# Patient Record
Sex: MALE | Race: ASIAN | Employment: OTHER | URBAN - METROPOLITAN AREA
[De-identification: names, ages, dates, MRNs, and addresses within clinical notes are randomized per-mention and may not be internally consistent; named-entity substitution may affect disease eponyms.]

---

## 2022-04-08 PROBLEM — R73.03 PREDIABETES: Status: ACTIVE | Noted: 2022-04-08

## 2022-04-08 PROBLEM — Z86.73 HISTORY OF CVA (CEREBROVASCULAR ACCIDENT): Status: ACTIVE | Noted: 2022-04-08

## 2022-04-08 PROBLEM — E78.2 MIXED HYPERLIPIDEMIA: Status: ACTIVE | Noted: 2022-04-08

## 2022-04-08 PROBLEM — Z85.46 HISTORY OF PROSTATE CANCER: Status: ACTIVE | Noted: 2022-04-08

## 2022-04-08 PROBLEM — F32.5 DEPRESSION, MAJOR, IN REMISSION (HCC): Status: ACTIVE | Noted: 2022-04-08

## 2022-04-08 PROBLEM — I10 ESSENTIAL HYPERTENSION, BENIGN: Status: ACTIVE | Noted: 2022-04-08

## 2022-04-08 PROBLEM — F32.5 DEPRESSION, MAJOR, IN REMISSION: Status: ACTIVE | Noted: 2022-04-08

## 2024-10-03 ENCOUNTER — OFFICE VISIT (OUTPATIENT)
Facility: CLINIC | Age: 84
End: 2024-10-03
Payer: MEDICARE

## 2024-10-03 VITALS
BODY MASS INDEX: 28.66 KG/M2 | WEIGHT: 172 LBS | OXYGEN SATURATION: 98 % | SYSTOLIC BLOOD PRESSURE: 118 MMHG | HEART RATE: 68 BPM | HEIGHT: 65 IN | DIASTOLIC BLOOD PRESSURE: 64 MMHG

## 2024-10-03 DIAGNOSIS — I10 ESSENTIAL HYPERTENSION: ICD-10-CM

## 2024-10-03 DIAGNOSIS — R80.9 TYPE 2 DIABETES MELLITUS WITH DIABETIC MICROALBUMINURIA, WITHOUT LONG-TERM CURRENT USE OF INSULIN (HCC): Primary | ICD-10-CM

## 2024-10-03 DIAGNOSIS — E11.29 TYPE 2 DIABETES MELLITUS WITH DIABETIC MICROALBUMINURIA, WITHOUT LONG-TERM CURRENT USE OF INSULIN (HCC): Primary | ICD-10-CM

## 2024-10-03 DIAGNOSIS — Z86.73 HISTORY OF CVA (CEREBROVASCULAR ACCIDENT): ICD-10-CM

## 2024-10-03 DIAGNOSIS — R80.1 PERSISTENT PROTEINURIA: ICD-10-CM

## 2024-10-03 DIAGNOSIS — E78.5 DYSLIPIDEMIA: ICD-10-CM

## 2024-10-03 PROBLEM — I70.0 THORACIC AORTA ATHEROSCLEROSIS: Status: ACTIVE | Noted: 2024-08-10

## 2024-10-03 PROBLEM — I70.0 THORACIC AORTA ATHEROSCLEROSIS (HCC): Status: ACTIVE | Noted: 2024-08-10

## 2024-10-03 PROBLEM — Z86.0100 HISTORY OF COLON POLYPS: Status: ACTIVE | Noted: 2024-04-15

## 2024-10-03 PROBLEM — K21.9 GASTROESOPHAGEAL REFLUX DISEASE WITHOUT ESOPHAGITIS: Status: ACTIVE | Noted: 2024-04-15

## 2024-10-03 LAB
CARTRIDGE EXPIRATION DATE: ABNORMAL DATE
HEMOGLOBIN A1C: 5.7 % (ref 4.3–5.6)

## 2024-10-03 PROCEDURE — 99204 OFFICE O/P NEW MOD 45 MIN: CPT | Performed by: FAMILY MEDICINE

## 2024-10-03 PROCEDURE — 83036 HEMOGLOBIN GLYCOSYLATED A1C: CPT | Performed by: FAMILY MEDICINE

## 2024-10-03 PROCEDURE — G0009 ADMIN PNEUMOCOCCAL VACCINE: HCPCS | Performed by: FAMILY MEDICINE

## 2024-10-03 PROCEDURE — 90677 PCV20 VACCINE IM: CPT | Performed by: FAMILY MEDICINE

## 2024-10-03 RX ORDER — DAPAGLIFLOZIN 5 MG/1
5 TABLET, FILM COATED ORAL DAILY
COMMUNITY
Start: 2024-09-03 | End: 2025-08-29

## 2024-10-03 RX ORDER — AMLODIPINE BESYLATE 2.5 MG/1
2.5 TABLET ORAL DAILY
COMMUNITY
Start: 2024-09-11

## 2024-10-03 RX ORDER — ATORVASTATIN CALCIUM 20 MG/1
20 TABLET, FILM COATED ORAL DAILY
COMMUNITY
Start: 2024-09-28

## 2024-10-03 NOTE — PROGRESS NOTES
HPI:    Patient ID: Jose Bertrand is a 84 year old male who presents to [facility] care.    HPI  Had hemorrhagic CVA around 2010. Has been walking more slowly and has had some slurred speech as residual deficits. No other issues.     DM:   Taking metformin 500mg daily and farxiga 5mg daily.   Hx of albuminuria and is on losartan. Sees nephrology.   On statin.     HTN:   Takes losartan and amlodipine. Amlodipine dose has been decreased.     Takes mirtazapine to help sleep, as well as melatonin.     Past Medical History:    Anxiety    Depression    Diabetes (HCC)    Essential hypertension    History of CVA (cerebrovascular accident)    Hyperlipidemia      Past Surgical History:   Procedure Laterality Date    Colonoscopy  2020    Other surgical history  2008    robotic prostatectomy     Vasectomy         Current Outpatient Medications   Medication Sig Dispense Refill    atorvastatin 20 MG Oral Tab Take 1 tablet (20 mg total) by mouth daily.      amLODIPine 2.5 MG Oral Tab Take 1 tablet (2.5 mg total) by mouth daily.      metFORMIN 500 MG Oral Tab Take 1 tablet (500 mg total) by mouth daily.      dapagliflozin 5 MG Oral Tab Take 1 tablet (5 mg total) by mouth daily.      losartan 100 MG Oral Tab Take 1 tablet (100 mg total) by mouth once daily.      multiple vitamin Oral Chew Tab Chew by mouth As Directed.      Coenzyme Q10 (CO Q 10) 10 MG Oral Cap Take by mouth As Directed.      omega-3 fatty acids 1000 MG Oral Cap Take by mouth As Directed.      sertraline 100 MG Oral Tab Take 1.5 tablets (150 mg total) by mouth daily.      mirtazapine 30 MG Oral Tab Take 1 tablet (30 mg total) by mouth daily.          Allergies   Allergen Reactions    Cephalosporins ANAPHYLAXIS     Family History   Problem Relation Age of Onset    Other (Other) Father         cirrhosis     Social History     Socioeconomic History    Marital status:      Spouse name: Not on file    Number of children: Not on file    Years of education: Not on file     Highest education level: Not on file   Occupational History    Not on file   Tobacco Use    Smoking status: Former     Current packs/day: 0.00     Types: Cigarettes     Quit date: 2002     Years since quittin.5    Smokeless tobacco: Never    Tobacco comments:     smoked 1ppd x 35 yrs   Vaping Use    Vaping status: Never Used   Substance and Sexual Activity    Alcohol use: Not Currently     Comment: once a year    Drug use: Never    Sexual activity: Not on file   Other Topics Concern    Not on file   Social History Narrative    Not on file     Social Determinants of Health     Financial Resource Strain: Not on file   Food Insecurity: Not on file   Transportation Needs: Not on file   Physical Activity: Not on file   Stress: Not on file   Social Connections: Not on file   Housing Stability: At Risk (2023)    Received from [facility]     Living Situation: Not on file     Housing Problems: Not on file       Review of Systems   Constitutional: Negative.    Respiratory: Negative.     Cardiovascular: Negative.    Gastrointestinal: Negative.    Neurological: Negative.    All other systems reviewed and are negative.           /64   Pulse 68   Ht 5' 5\" (1.651 m)   Wt 172 lb (78 kg)   SpO2 98%   BMI 28.62 kg/m²     PHYSICAL EXAM:   Physical Exam  Constitutional:       General: He is not in acute distress.     Appearance: Normal appearance. He is well-developed. He is not ill-appearing, toxic-appearing or diaphoretic.   HENT:      Head: Normocephalic and atraumatic.      Right Ear: Tympanic membrane, ear canal and external ear normal.      Left Ear: Tympanic membrane, ear canal and external ear normal.      Nose: Nose normal.      Mouth/Throat:      Mouth: Mucous membranes are moist.      Pharynx: Oropharynx is clear. No oropharyngeal exudate or posterior oropharyngeal erythema.   Eyes:      Extraocular Movements: Extraocular movements intact.      Conjunctiva/sclera: Conjunctivae  normal.   Cardiovascular:      Rate and Rhythm: Normal rate and regular rhythm.      Pulses: Normal pulses.      Heart sounds: Normal heart sounds. No murmur heard.     No friction rub. No gallop.   Pulmonary:      Effort: Pulmonary effort is normal. No respiratory distress.      Breath sounds: Normal breath sounds. No wheezing or rales.   Musculoskeletal:      Cervical back: Neck supple.      Right lower leg: No edema.      Left lower leg: No edema.   Skin:     General: Skin is warm and dry.      Capillary Refill: Capillary refill takes less than 2 seconds.   Neurological:      General: No focal deficit present.      Mental Status: He is alert.   Psychiatric:         Mood and Affect: Mood normal.         Behavior: Behavior normal.         Thought Content: Thought content normal.         Judgment: Judgment normal.             ASSESSMENT/PLAN:     Encounter Diagnoses   Name Primary?    Type 2 diabetes mellitus with diabetic microalbuminuria, without long-term current use of insulin (HCC) Yes    Persistent proteinuria     Essential hypertension     Dyslipidemia     History of CVA (cerebrovascular accident)        1. Type 2 diabetes mellitus with diabetic microalbuminuria, without long-term current use of insulin ([facility])  - POC Hemoglobin A1C  -POC A1c today: 5.7%  -Reviewed all recent labs this year. Overall very well-controlled.  -Discussed discontinuing metformin, although he prefers to continue. Continue farxiga.  -On statin & ARB.   -PCV20 given today.   -RTC in 6 months for f/u.     2. Persistent proteinuria  -On ARB & farxiga. Sees nephrology.     3. Essential hypertension  -Well-controlled. CPM.     4. Dyslipidemia  -Well-controlled. CPM.     5. History of CVA (cerebrovascular accident)  -No current concerns.     Meds This Visit:  Requested Prescriptions      No prescriptions requested or ordered in this encounter       Imaging & Referrals:  PCV20 VACCINE FOR INTRAMUSCULAR USE       Scot More DO  ID#[id number]

## 2024-10-07 ENCOUNTER — TELEPHONE (OUTPATIENT)
Facility: CLINIC | Age: 84
End: 2024-10-07

## 2024-10-07 DIAGNOSIS — E11.29 TYPE 2 DIABETES MELLITUS WITH DIABETIC MICROALBUMINURIA, WITHOUT LONG-TERM CURRENT USE OF INSULIN (HCC): Primary | ICD-10-CM

## 2024-10-07 DIAGNOSIS — R80.9 TYPE 2 DIABETES MELLITUS WITH DIABETIC MICROALBUMINURIA, WITHOUT LONG-TERM CURRENT USE OF INSULIN (HCC): Primary | ICD-10-CM

## 2024-10-07 RX ORDER — ACYCLOVIR 400 MG/1
1 TABLET ORAL
Qty: 3 EACH | Refills: 11 | Status: SHIPPED | OUTPATIENT
Start: 2024-10-07

## 2024-10-07 NOTE — TELEPHONE ENCOUNTER
Patient notified, he verbalized understanding. States he is having extensive dental work done and prefers to keep track of his sugar levels while being treated for that.

## 2024-10-07 NOTE — TELEPHONE ENCOUNTER
Pt stated he was seen 10/3/24 stated for got to ask  since he's a diabetic want to request a  Dexcom G7  or the Freestyle Deidra 3   Asking which one should he try because he do not want to stick his finger again

## 2024-10-07 NOTE — TELEPHONE ENCOUNTER
Rx sent, although I don't need him to check his glucose levels given he is not on insulin and his A1c is very well-controlled. His insurance may not cover the CGM for these reasons as well. Please let pt know. Thank you.

## 2024-12-24 RX ORDER — ACYCLOVIR 400 MG/1
1 TABLET ORAL AS DIRECTED
Qty: 1 EACH | Refills: 3 | Status: SHIPPED | OUTPATIENT
Start: 2024-12-24

## 2024-12-24 NOTE — TELEPHONE ENCOUNTER
Patient calling, cell phone not working   Patient says that he is not able to check sugars and needs a  for DEXCOM G7 sent to pharmacy ASAP so he can check sugars.     Please review and send if agreeable and appropriate.   Please review script for accuracy, patient has not had one of these before because it went to phone     Walk in

## 2024-12-24 NOTE — TELEPHONE ENCOUNTER
Refill passed per [facility] protocol.    Unique Lenz, RN5 minutes ago (11:18 AM)     SG  Patient calling, cell phone not working   Patient says that he is not able to check sugars and needs a  for DEXCOM G7 sent to pharmacy ASAP so he can check sugars.      Please review and send if agreeable and appropriate.   Please review script for accuracy, patient has not had one of these before because it went to phone        Requested Prescriptions   Pending Prescriptions Disp Refills    Continuous Glucose  (DEXCOM G7 ) Does not apply Device 1 each 0     Si Units daily.       Diabetic Supplies Protocol Passed - 2024 11:24 AM        Passed - In person appointment or virtual visit in the past 12 mos or appointment in next 3 mos     Recent Outpatient Visits              2 months ago Type 2 diabetes mellitus with diabetic microalbuminuria, without long-term current use of insulin (HCC)    [facility] Scot More DO    Office Visit    2 years ago Essential hypertension, benign    [facility] - [facility] Barrera Gina E, MD    Office Visit                         Recent Outpatient Visits              2 months ago Type 2 diabetes mellitus with diabetic microalbuminuria, without long-term current use of insulin (HCC)    [facility] ([place], [place]) Scot More DO    Office Visit    2 years ago Essential hypertension, benign    [facility] - Julianne Barrear Gina E, MD    Office Visit

## 2025-01-06 ENCOUNTER — TELEPHONE (OUTPATIENT)
Facility: CLINIC | Age: 85
End: 2025-01-06

## 2025-01-06 NOTE — TELEPHONE ENCOUNTER
Patient is requesting refill on the following medication. Please assist.    Continuous Glucose Sensor (DEXCOM G7 SENSOR) Does not apply [facility]     [id number] - [place], [place] - [address] [phone], [phone]

## 2025-01-13 ENCOUNTER — OFFICE VISIT (OUTPATIENT)
Facility: CLINIC | Age: 85
End: 2025-01-13
Payer: MEDICARE

## 2025-01-13 VITALS
BODY MASS INDEX: 27.82 KG/M2 | SYSTOLIC BLOOD PRESSURE: 114 MMHG | WEIGHT: 167 LBS | DIASTOLIC BLOOD PRESSURE: 72 MMHG | OXYGEN SATURATION: 98 % | HEART RATE: 80 BPM | HEIGHT: 65 IN

## 2025-01-13 DIAGNOSIS — E11.29 TYPE 2 DIABETES MELLITUS WITH DIABETIC MICROALBUMINURIA, WITHOUT LONG-TERM CURRENT USE OF INSULIN (HCC): Primary | ICD-10-CM

## 2025-01-13 DIAGNOSIS — R80.9 TYPE 2 DIABETES MELLITUS WITH DIABETIC MICROALBUMINURIA, WITHOUT LONG-TERM CURRENT USE OF INSULIN (HCC): Primary | ICD-10-CM

## 2025-01-13 DIAGNOSIS — R80.1 PERSISTENT PROTEINURIA: ICD-10-CM

## 2025-01-13 DIAGNOSIS — I10 ESSENTIAL HYPERTENSION: ICD-10-CM

## 2025-01-13 NOTE — PROGRESS NOTES
HPI:    Patient ID: Jose Bertrand is a 84 year old male who presents for f/u.    HPI  DM:   Taking metformin 500mg daily and farxiga 5mg daily.   Hx of albuminuria and is on losartan. Sees nephrology.   On statin.  Has been eating better since last visit.       HTN:   Takes losartan and amlodipine.     Had colonoscopy 3-4 years ago at [facility]. Did have a few polyps but unsure of recs to repeat.     Past Medical History:    Anxiety    Depression    Diabetes (HCC)    Essential hypertension    History of CVA (cerebrovascular accident)    Hyperlipidemia        Current Outpatient Medications   Medication Sig Dispense Refill    Continuous Glucose  (DEXCOM G7 ) Does not apply Device 1 kit As Directed. 1 each 3    Continuous Glucose Sensor (DEXCOM G7 SENSOR) Does not apply Misc 1 each Every 10 days. Use as directed every 10 days 3 each 11    atorvastatin 20 MG Oral Tab Take 1 tablet (20 mg total) by mouth daily.      amLODIPine 2.5 MG Oral Tab Take 1 tablet (2.5 mg total) by mouth daily.      metFORMIN 500 MG Oral Tab Take 1 tablet (500 mg total) by mouth daily.      dapagliflozin 5 MG Oral Tab Take 1 tablet (5 mg total) by mouth daily.      losartan 100 MG Oral Tab Take 1 tablet (100 mg total) by mouth once daily.      multiple vitamin Oral Chew Tab Chew by mouth As Directed.      Coenzyme Q10 (CO Q 10) 10 MG Oral Cap Take by mouth As Directed.      omega-3 fatty acids 1000 MG Oral Cap Take by mouth As Directed.      sertraline 100 MG Oral Tab Take 1.5 tablets (150 mg total) by mouth daily.      mirtazapine 30 MG Oral Tab Take 1 tablet (30 mg total) by mouth daily.          Allergies[1]    Review of Systems   Constitutional: Negative.    Respiratory: Negative.     Cardiovascular: Negative.    Gastrointestinal: Negative.    Neurological: Negative.    All other systems reviewed and are negative.           /72   Pulse 80   Ht 5' 5\" (1.651 m)   Wt 167 lb (75.8 kg)   SpO2 98%   BMI 27.79 kg/m²      PHYSICAL EXAM:   Physical Exam  Constitutional:       General: He is not in acute distress.     Appearance: Normal appearance. He is not ill-appearing, toxic-appearing or diaphoretic.   HENT:      Head: Normocephalic and atraumatic.      Right Ear: External ear normal.      Left Ear: External ear normal.      Nose: Nose normal.      Mouth/Throat:      Mouth: Mucous membranes are moist.      Pharynx: Oropharynx is clear.   Eyes:      Extraocular Movements: Extraocular movements intact.      Conjunctiva/sclera: Conjunctivae normal.   Cardiovascular:      Rate and Rhythm: Normal rate and regular rhythm.      Pulses: Normal pulses.      Heart sounds: Normal heart sounds. No murmur heard.     No friction rub. No gallop.   Pulmonary:      Effort: Pulmonary effort is normal. No respiratory distress.      Breath sounds: Normal breath sounds. No wheezing, rhonchi or rales.   Musculoskeletal:      Right lower leg: No edema.      Left lower leg: No edema.   Skin:     General: Skin is warm and dry.      Capillary Refill: Capillary refill takes less than 2 seconds.   Neurological:      General: No focal deficit present.      Mental Status: He is alert.   Psychiatric:         Mood and Affect: Mood normal.         Behavior: Behavior normal.         Thought Content: Thought content normal.         Judgment: Judgment normal.             ASSESSMENT/PLAN:     Encounter Diagnoses   Name Primary?    Type 2 diabetes mellitus with diabetic microalbuminuria, without long-term current use of insulin ([facility]) Yes    Persistent proteinuria     Essential hypertension        1. Type 2 diabetes mellitus with diabetic microalbuminuria, without long-term current use of insulin ([facility])  - POC Hemoglobin A1C  - Ophthalmology Referral - In Network  -POC A1c today: 5.4%  -Again recommended to discontinue metformin, although he prefers to continue but decrease the dose. Will decrease to 1/2 tablet per day of the 500mg tablet. Continue farxiga daily.    -On statin & ARB.   -Overdue for eye exam. Referral generated.   -RTC in 6 months for f/u & annual visit. He prefers to repeat A1c in 3 months, thus will RTC for RN visit for A1c in 3 months per request.     2. Persistent proteinuria  -Sees nephrology. On farxiga. On ARB.     3. Essential hypertension  -Well-controlled. CPM.     Of note, medical record release form signed today to obtain colonoscopy results from [facility].     Meds This Visit:  Requested Prescriptions      No prescriptions requested or ordered in this encounter       Imaging & Referrals:  OPHTHALMOLOGY - INTERNAL  INFLUENZA VAC HIGH DOSE PRSV FREE       Scot More,   ID#[id number]         [1]   Allergies  Allergen Reactions    Cephalosporins ANAPHYLAXIS

## 2025-01-17 ENCOUNTER — TELEPHONE (OUTPATIENT)
Facility: CLINIC | Age: 85
End: 2025-01-17

## 2025-01-17 NOTE — TELEPHONE ENCOUNTER
Dr. Bhupendra Francis ([facility]) called, verified patient's Name and . States patient has oral implant that failed and needs reevaluation. He wants to confirm patient's diabetes history and management. Requesting recent office visit note.    Called patient to ask permission to give this information to Dr. Francis and he agreed. Recent office visit note  successfully faxed to [phone] via [employer] at 1:06.

## 2025-04-07 ENCOUNTER — HOSPITAL ENCOUNTER (OUTPATIENT)
Dept: MRI IMAGING | Facility: HOSPITAL | Age: 85
Discharge: HOME | End: 2025-04-07
Attending: HOSPITALIST
Payer: MEDICARE

## 2025-04-07 DIAGNOSIS — S02.81XA: ICD-10-CM

## 2025-04-07 DIAGNOSIS — F33.2 SEVERE RECURRENT MAJOR DEPRESSION WITHOUT PSYCHOTIC FEATURES (HCC): ICD-10-CM

## 2025-04-07 PROCEDURE — 70551 MRI BRAIN STEM W/O DYE: CPT | Performed by: HOSPITALIST

## 2025-04-14 ENCOUNTER — NURSE ONLY (OUTPATIENT)
Facility: CLINIC | Age: 85
End: 2025-04-14
Payer: MEDICARE

## 2025-04-14 DIAGNOSIS — R80.9 TYPE 2 DIABETES MELLITUS WITH DIABETIC MICROALBUMINURIA, WITHOUT LONG-TERM CURRENT USE OF INSULIN (HCC): Primary | ICD-10-CM

## 2025-04-14 DIAGNOSIS — E11.29 TYPE 2 DIABETES MELLITUS WITH DIABETIC MICROALBUMINURIA, WITHOUT LONG-TERM CURRENT USE OF INSULIN (HCC): Primary | ICD-10-CM

## 2025-04-14 LAB — HEMOGLOBIN A1C: 5.4 % (ref 4.3–5.6)

## 2025-06-18 ENCOUNTER — TELEPHONE (OUTPATIENT)
Facility: CLINIC | Age: 85
End: 2025-06-18

## 2025-06-18 DIAGNOSIS — Z12.5 PROSTATE CANCER SCREENING: Primary | ICD-10-CM

## 2025-06-18 DIAGNOSIS — R80.9 TYPE 2 DIABETES MELLITUS WITH DIABETIC MICROALBUMINURIA, WITHOUT LONG-TERM CURRENT USE OF INSULIN (HCC): ICD-10-CM

## 2025-06-18 DIAGNOSIS — E11.29 TYPE 2 DIABETES MELLITUS WITH DIABETIC MICROALBUMINURIA, WITHOUT LONG-TERM CURRENT USE OF INSULIN (HCC): ICD-10-CM

## 2025-06-18 NOTE — TELEPHONE ENCOUNTER
patient stated that on July 14 he is coming in to have a A1c done. Patient also wants to get his PSA done. I have pended the order for your review and approval. Thank you       Future Appointments   Date Time Provider Department Center   7/14/2025 10:00 AM [facility] FM OP NURSE [facility] FP [facility] OP

## 2025-06-18 NOTE — TELEPHONE ENCOUNTER
Spoke to patient's daughter (on Release of Information), verified Name and . Relayed provider's message below. Verbalized understanding and will let the patient know. No further questions at this time.

## 2025-07-14 ENCOUNTER — NURSE ONLY (OUTPATIENT)
Facility: CLINIC | Age: 85
End: 2025-07-14
Payer: MEDICARE

## 2025-07-14 ENCOUNTER — LAB ENCOUNTER (OUTPATIENT)
Dept: LAB | Age: 85
End: 2025-07-14
Attending: FAMILY MEDICINE
Payer: MEDICARE

## 2025-07-14 DIAGNOSIS — Z12.5 PROSTATE CANCER SCREENING: ICD-10-CM

## 2025-07-14 DIAGNOSIS — E11.29 TYPE 2 DIABETES MELLITUS WITH DIABETIC MICROALBUMINURIA, WITHOUT LONG-TERM CURRENT USE OF INSULIN (HCC): Primary | ICD-10-CM

## 2025-07-14 DIAGNOSIS — R80.9 TYPE 2 DIABETES MELLITUS WITH DIABETIC MICROALBUMINURIA, WITHOUT LONG-TERM CURRENT USE OF INSULIN (HCC): Primary | ICD-10-CM

## 2025-07-14 LAB
COMPLEXED PSA SERPL-MCNC: 0.04 NG/ML (ref ?–4)
HEMOGLOBIN A1C: 5.5 % (ref 4.3–5.6)

## 2025-07-14 PROCEDURE — 36415 COLL VENOUS BLD VENIPUNCTURE: CPT

## 2025-07-15 ENCOUNTER — TELEPHONE (OUTPATIENT)
Facility: CLINIC | Age: 85
End: 2025-07-15

## 2025-07-15 NOTE — TELEPHONE ENCOUNTER
Patient wanted to confirm when he needed to see Dr More for a follow up appointment? Last office visit was on 1/13/2025 stated to follow up in 6 months. No appointments available. Ok to use SDA slots?

## 2025-08-18 ENCOUNTER — TELEPHONE (OUTPATIENT)
Facility: CLINIC | Age: 85
End: 2025-08-18